# Patient Record
Sex: FEMALE | Race: BLACK OR AFRICAN AMERICAN | NOT HISPANIC OR LATINO | URBAN - METROPOLITAN AREA
[De-identification: names, ages, dates, MRNs, and addresses within clinical notes are randomized per-mention and may not be internally consistent; named-entity substitution may affect disease eponyms.]

---

## 2023-01-30 ENCOUNTER — APPOINTMENT (RX ONLY)
Dept: URBAN - METROPOLITAN AREA CLINIC 49 | Facility: CLINIC | Age: 53
Setting detail: DERMATOLOGY
End: 2023-01-30

## 2023-01-30 DIAGNOSIS — L23.89 ALLERGIC CONTACT DERMATITIS DUE TO OTHER AGENTS: ICD-10-CM

## 2023-01-30 PROBLEM — L30.9 DERMATITIS, UNSPECIFIED: Status: ACTIVE | Noted: 2023-01-30

## 2023-01-30 PROCEDURE — 99202 OFFICE O/P NEW SF 15 MIN: CPT

## 2023-01-30 PROCEDURE — ? COUNSELING

## 2023-01-30 PROCEDURE — ? ADDITIONAL NOTES

## 2023-01-30 ASSESSMENT — LOCATION DETAILED DESCRIPTION DERM: LOCATION DETAILED: RIGHT DISTAL PRETIBIAL REGION

## 2023-01-30 ASSESSMENT — LOCATION SIMPLE DESCRIPTION DERM: LOCATION SIMPLE: RIGHT PRETIBIAL REGION

## 2023-01-30 ASSESSMENT — LOCATION ZONE DERM: LOCATION ZONE: LEG

## 2023-01-30 NOTE — PROCEDURE: ADDITIONAL NOTES
Additional Notes: Patient showed photos of blisters that were present, they have since opened and are resolving. States it happened once about 5 years ago, as well. She cannot think of anything she came in contact with but states her boyfriend works [employer] so there is a chance she could have come in contact with something from him.\\nAdvised her to put aquaphor to area to allow to fully heal and RTC if lesions recur, at which time I can biopsy. Additional Notes: Patient showed photos of blisters that were present, they have since opened and are resolving. States it happened once about 5 years ago, as well. She cannot think of anything she came in contact with but states her boyfriend works [employer] so there is a chance she could have come in contact with something from him.\\nAdvised her to put aquaphor to area to allow to fully heal and RTC if lesions recur, at which time I can biopsy.

## 2023-01-30 NOTE — HPI: BLISTERS
How Severe Are Your Blisters?: mild
Please Check The Phrase That Best Describes Your Blisters.: unilateral
Is This A New Presentation, Or A Follow-Up?: Blister

## 2023-11-08 ENCOUNTER — APPOINTMENT (RX ONLY)
Dept: URBAN - METROPOLITAN AREA CLINIC 70 | Facility: CLINIC | Age: 53
Setting detail: DERMATOLOGY
End: 2023-11-08

## 2023-11-08 DIAGNOSIS — L71.0 PERIORAL DERMATITIS: ICD-10-CM | Status: INADEQUATELY CONTROLLED

## 2023-11-08 PROCEDURE — 99213 OFFICE O/P EST LOW 20 MIN: CPT

## 2023-11-08 PROCEDURE — ? PRESCRIPTION

## 2023-11-08 PROCEDURE — ? COUNSELING

## 2023-11-08 RX ORDER — DESOXIMETASONE 2.5 MG/G
CREAM TOPICAL
Qty: 60 | Refills: 1 | Status: ERX | COMMUNITY
Start: 2023-11-08

## 2023-11-08 RX ORDER — DOXYCYCLINE 100 MG/1
TABLET, FILM COATED ORAL BID
Qty: 60 | Refills: 1 | Status: ERX | COMMUNITY
Start: 2023-11-08

## 2023-11-08 RX ADMIN — DOXYCYCLINE: 100 TABLET, FILM COATED ORAL at 00:00

## 2023-11-08 RX ADMIN — DESOXIMETASONE: 2.5 CREAM TOPICAL at 00:00

## 2023-11-08 ASSESSMENT — LOCATION SIMPLE DESCRIPTION DERM: LOCATION SIMPLE: LEFT LIP

## 2023-11-08 ASSESSMENT — LOCATION DETAILED DESCRIPTION DERM: LOCATION DETAILED: LEFT LOWER CUTANEOUS LIP

## 2023-11-08 ASSESSMENT — LOCATION ZONE DERM: LOCATION ZONE: LIP

## 2023-11-15 RX ORDER — DESOXIMETASONE 2.5 MG/G
CREAM TOPICAL
Qty: 15 | Refills: 1 | Status: ERX